# Patient Record
Sex: MALE | Race: WHITE | NOT HISPANIC OR LATINO | Employment: FULL TIME | URBAN - METROPOLITAN AREA
[De-identification: names, ages, dates, MRNs, and addresses within clinical notes are randomized per-mention and may not be internally consistent; named-entity substitution may affect disease eponyms.]

---

## 2023-08-04 ENCOUNTER — HOSPITAL ENCOUNTER (OUTPATIENT)
Facility: HOSPITAL | Age: 43
Discharge: HOME | End: 2023-08-04
Attending: EMERGENCY MEDICINE | Admitting: EMERGENCY MEDICINE
Payer: MEDICAID

## 2023-08-04 ENCOUNTER — APPOINTMENT (OUTPATIENT)
Dept: GENERAL RADIOLOGY | Facility: HOSPITAL | Age: 43
End: 2023-08-04
Payer: MEDICAID

## 2023-08-04 VITALS
BODY MASS INDEX: 36.4 KG/M2 | RESPIRATION RATE: 16 BRPM | SYSTOLIC BLOOD PRESSURE: 176 MMHG | HEART RATE: 116 BPM | WEIGHT: 260 LBS | OXYGEN SATURATION: 97 % | TEMPERATURE: 98.5 F | HEIGHT: 71 IN | DIASTOLIC BLOOD PRESSURE: 122 MMHG

## 2023-08-04 DIAGNOSIS — S63.91XA SPRAIN OF RIGHT HAND, INITIAL ENCOUNTER: Primary | ICD-10-CM

## 2023-08-04 DIAGNOSIS — I10 HYPERTENSION, UNSPECIFIED TYPE: ICD-10-CM

## 2023-08-04 PROCEDURE — G0463 HOSPITAL OUTPT CLINIC VISIT: HCPCS | Performed by: NURSE PRACTITIONER

## 2023-08-04 PROCEDURE — 73130 X-RAY EXAM OF HAND: CPT

## 2023-08-04 RX ORDER — CLONIDINE HYDROCHLORIDE 0.1 MG/1
0.1 TABLET ORAL ONCE
Status: COMPLETED | OUTPATIENT
Start: 2023-08-04 | End: 2023-08-04

## 2023-08-04 RX ORDER — AMLODIPINE BESYLATE 5 MG/1
5 TABLET ORAL DAILY
Qty: 14 TABLET | Refills: 0 | Status: SHIPPED | OUTPATIENT
Start: 2023-08-04 | End: 2023-08-18

## 2023-08-04 RX ADMIN — CLONIDINE HYDROCHLORIDE 0.1 MG: 0.1 TABLET ORAL at 18:35

## 2023-08-04 NOTE — DISCHARGE INSTRUCTIONS
Call the patient care connect number and they will help you find a primary care physician.    Medications as prescribed.    You should also know if your body is used to your blood pressure being elevated, when you start on blood pressure medications then may feel weak, fatigued, and tired till you get used to the blood pressure being lower.

## 2023-08-05 NOTE — FSED PROVIDER NOTE
Subjective   History of Present Illness  Patient is a 42-year-old male who presents to the ER with right hand and thumb pain for the past for the past several weeks. Patient reports he initially had some bruising noted, but that has gone away. Patient reports he is still having pain in the thumb area, aponte side of hand.       Review of Systems   Musculoskeletal:         Right hand pain, aponte side of thumb area.      No past medical history on file.    No Known Allergies    No past surgical history on file.    No family history on file.    Social History     Socioeconomic History    Marital status: Single           Objective   Physical Exam  Vitals and nursing note reviewed.   Constitutional:       Appearance: Normal appearance.   HENT:      Head: Normocephalic.      Right Ear: Tympanic membrane normal.      Left Ear: Tympanic membrane normal.   Cardiovascular:      Rate and Rhythm: Normal rate and regular rhythm.      Pulses: Normal pulses.      Heart sounds: Normal heart sounds.   Pulmonary:      Breath sounds: Normal breath sounds.   Abdominal:      Palpations: Abdomen is soft.   Musculoskeletal:         General: Normal range of motion.        Hands:       Cervical back: Normal range of motion and neck supple.      Comments: Patient with pain in aponte surface of the right hand, thumb area.    Skin:     General: Skin is warm and dry.   Neurological:      General: No focal deficit present.      Mental Status: He is alert and oriented to person, place, and time.   Psychiatric:         Mood and Affect: Mood normal.         Behavior: Behavior normal. Behavior is cooperative.       Procedures           ED Course  ED Course as of 08/04/23 2046   Fri Aug 04, 2023   1820 The bone mineral density is normal. There are no fractures or dislocations. No radiopaque foreign bodies. Mild triscaphe the joint space narrowing.     IMPRESSION:  Impression:  No fractures or dislocations of the hand.   [DS]   1830 Discussed blood  pressure with Dr. Null, will give clonidine 0.1mg at this time.  [DS]   1910 Discussed with Dr. Levin will start patient on amlodipine and have patient follow up with PCP for further evaluation and treatment. Patient denies any headaches,visual changes or disturbances, no chest pain, at this time. Patient advised to keep checking his blood pressure at home and keep record to show PCP  [DS]      ED Course User Index  [DS] Damaris Becerra APRN                                           Medical Decision Making  Patient is a 42-year-old male who presents to the ER with right hand and thumb pain for the past for the past several weeks. Patient reports he initially had some bruising noted, but that has gone away. Patient reports he is still having pain in the thumb area, aponte side of hand.  Patient does not remember any injury or trauma to the hand.       Problems Addressed:  Hypertension, unspecified type: complicated acute illness or injury  Sprain of right hand, initial encounter: complicated acute illness or injury    Amount and/or Complexity of Data Reviewed  Radiology: ordered.    Risk  Prescription drug management.        Final diagnoses:   Sprain of right hand, initial encounter   Hypertension, unspecified type       ED Disposition  ED Disposition       ED Disposition   Discharge    Condition   Stable    Comment   --               PATIENT CONNECTION - [facility] [id number]  [phone]  In 1 week  you neet to get a follow up appointment with a primary care physician to have your blood pressure rechecked,         Medication List        New Prescriptions      amLODIPine 5 MG tablet  Commonly known as: NORVASC  Take 1 tablet by mouth Daily for 14 days.               Where to Get Your Medications        These medications were sent to [facility]/pharmacy #[id number] - [place], [place] - [address] [facility] [phone]  - [phone]  [address] [place] [address] [place] [address]      Hours: 24-hours  Phone: [phone]   amLODIPine 5 MG tablet

## 2023-10-23 ENCOUNTER — HOSPITAL ENCOUNTER (OUTPATIENT)
Facility: HOSPITAL | Age: 43
Discharge: HOME | End: 2023-10-23
Attending: PEDIATRICS | Admitting: EMERGENCY MEDICINE
Payer: MEDICAID

## 2023-10-23 VITALS
DIASTOLIC BLOOD PRESSURE: 107 MMHG | RESPIRATION RATE: 16 BRPM | TEMPERATURE: 98 F | SYSTOLIC BLOOD PRESSURE: 159 MMHG | OXYGEN SATURATION: 98 % | HEIGHT: 71 IN | BODY MASS INDEX: 35 KG/M2 | WEIGHT: 250 LBS | HEART RATE: 106 BPM

## 2023-10-23 DIAGNOSIS — S86.911A STRAIN OF RIGHT KNEE, INITIAL ENCOUNTER: Primary | ICD-10-CM

## 2023-10-23 PROCEDURE — G0463 HOSPITAL OUTPT CLINIC VISIT: HCPCS

## 2023-10-23 NOTE — Clinical Note
[facility] [place] [id number] [place] [address] [place] [address]  Phone: [phone]    Jett Olivarez was seen and treated in our emergency department on 10/23/2023.  He may return to work on 10/26/2023.         Thank you for choosing [facility].    Micky Velazquez Jr., APRN

## 2023-10-24 NOTE — DISCHARGE INSTRUCTIONS
Recommend that you wear your knee brace over the next 2 days or until you are able to follow-up with orthopedist    Continue alternating between periods of icing and periods of warm compress to your knee.    Recommend resting the next 2 days

## 2023-10-24 NOTE — FSED PROVIDER NOTE
Subjective   History of Present Illness  43-year-old male reports injuring his right knee on October 14, reports that he was off work for several days rested his knee and then returned to work.  He reports that over the last few days he has had increasing pain to his right knee.  Reports he has a follow-up with orthopedist in 2 days.  He is here today explaining to me that he feels like he cannot continue to work.  He reports that after resting he went immediately to work and did not have any strengthening exercises.  He is denying any new injuries        Review of Systems   All other systems reviewed and are negative.      Past Medical History:   Diagnosis Date    Hypertension        No Known Allergies    History reviewed. No pertinent surgical history.    History reviewed. No pertinent family history.    Social History     Socioeconomic History    Marital status: Single   Tobacco Use    Smoking status: Every Day     Packs/day: 1     Types: Cigarettes           Objective   Physical Exam  Constitutional:       Appearance: Normal appearance.   HENT:      Head: Normocephalic and atraumatic.      Nose: Nose normal.      Mouth/Throat:      Mouth: Mucous membranes are moist.      Pharynx: Oropharynx is clear.   Eyes:      Extraocular Movements: Extraocular movements intact.      Pupils: Pupils are equal, round, and reactive to light.   Cardiovascular:      Rate and Rhythm: Normal rate.      Pulses: Normal pulses.      Heart sounds: Normal heart sounds.   Pulmonary:      Effort: Pulmonary effort is normal.      Breath sounds: Normal breath sounds.   Abdominal:      General: Abdomen is flat.      Palpations: Abdomen is soft.   Musculoskeletal:      Cervical back: Normal range of motion.      Comments: Patient has reproducible tenderness with palpation of the medial aspect of the right patella.   Skin:     General: Skin is warm.   Neurological:      General: No focal deficit present.      Mental Status: He is alert and  oriented to person, place, and time.         Procedures           ED Course                                           Medical Decision Making  His main concern is that he cannot work the next 2 days.  I have agreed to provide him a work note he is following up with orthopedist in 2 days.  We will also provide a knee brace recommend continued alternating between Tylenol and Motrin he is agreeable plan of care    Problems Addressed:  Strain of right knee, initial encounter: acute illness or injury        Final diagnoses:   Strain of right knee, initial encounter       ED Disposition  ED Disposition       ED Disposition   Discharge    Condition   Stable    Comment   --               Provider, No Known  [facility] [place] [address]               Medication List      No changes were made to your prescriptions during this visit.